# Patient Record
Sex: FEMALE | Race: BLACK OR AFRICAN AMERICAN | NOT HISPANIC OR LATINO | URBAN - METROPOLITAN AREA
[De-identification: names, ages, dates, MRNs, and addresses within clinical notes are randomized per-mention and may not be internally consistent; named-entity substitution may affect disease eponyms.]

---

## 2021-11-24 ENCOUNTER — WEB ENCOUNTER (OUTPATIENT)
Dept: URBAN - METROPOLITAN AREA CLINIC 74 | Facility: CLINIC | Age: 56
End: 2021-11-24

## 2021-11-24 ENCOUNTER — DASHBOARD ENCOUNTERS (OUTPATIENT)
Age: 56
End: 2021-11-24

## 2021-11-24 ENCOUNTER — OFFICE VISIT (OUTPATIENT)
Dept: URBAN - METROPOLITAN AREA CLINIC 74 | Facility: CLINIC | Age: 56
End: 2021-11-24
Payer: COMMERCIAL

## 2021-11-24 DIAGNOSIS — R14.2 BURPING: ICD-10-CM

## 2021-11-24 DIAGNOSIS — K59.04 CHRONIC IDIOPATHIC CONSTIPATION: ICD-10-CM

## 2021-11-24 DIAGNOSIS — R14.0 BLOATING: ICD-10-CM

## 2021-11-24 DIAGNOSIS — Z12.11 COLON CANCER SCREENING: ICD-10-CM

## 2021-11-24 PROBLEM — 82934008: Status: ACTIVE | Noted: 2021-11-24

## 2021-11-24 PROBLEM — 305058001: Status: ACTIVE | Noted: 2021-11-24

## 2021-11-24 PROBLEM — 271834000: Status: ACTIVE | Noted: 2021-11-24

## 2021-11-24 PROBLEM — 116289008: Status: ACTIVE | Noted: 2021-11-24

## 2021-11-24 PROCEDURE — 99204 OFFICE O/P NEW MOD 45 MIN: CPT | Performed by: STUDENT IN AN ORGANIZED HEALTH CARE EDUCATION/TRAINING PROGRAM

## 2021-11-24 RX ORDER — LINACLOTIDE 290 UG/1
1 CAPSULE AT LEAST 30 MINUTES BEFORE THE FIRST MEAL OF THE DAY ON AN EMPTY STOMACH CAPSULE, GELATIN COATED ORAL ONCE A DAY
Qty: 90 | Refills: 3 | OUTPATIENT
Start: 2021-11-24 | End: 2022-11-19

## 2021-11-24 NOTE — HPI-TODAY'S VISIT:
56-year-old female New to me Comes in for evaluation of new onset constipation and excessive bloating with burping. Patient says that she carries a diagnosis of CSF leak with still needs to be managed and currently is unable to find a new provider as she was not happy with the prior provider. She reports that in the last 3 to 4 weeks she is not moving her bowels regularly.  This is new for her although in the past this has happened but usually responded to over-the-counter medications and laxatives.  Patient is also reporting upper abdomen discomfort with intermittent nausea and excessive bloating and burping.  Patient states that she is not able to take her home remedies and over-the-counter laxatives due to same symptoms.  Denies seeing any blood in the stool.  No diarrhea in the past. Overall good appetite and denies any unintentional weight loss. Did had a colonoscopy about 8 years back which was negative for any polyps or mass lesions and she was recommended 10-year follow-up. Patient otherwise denies any reflux or dysphagia or odynophagia.  No vomiting.